# Patient Record
Sex: MALE | Race: WHITE | Employment: PART TIME | URBAN - METROPOLITAN AREA
[De-identification: names, ages, dates, MRNs, and addresses within clinical notes are randomized per-mention and may not be internally consistent; named-entity substitution may affect disease eponyms.]

---

## 2020-07-01 ENCOUNTER — NURSE ONLY (OUTPATIENT)
Dept: PRIMARY CARE CLINIC | Age: 30
End: 2020-07-01

## 2020-07-01 PROCEDURE — 99211 OFF/OP EST MAY X REQ PHY/QHP: CPT | Performed by: NURSE PRACTITIONER

## 2020-07-04 LAB
SARS-COV-2: NOT DETECTED
SOURCE: NORMAL

## 2022-01-16 ENCOUNTER — HOSPITAL ENCOUNTER (EMERGENCY)
Age: 32
Discharge: HOME | End: 2022-01-16
Attending: EMERGENCY MEDICINE
Payer: COMMERCIAL

## 2022-01-16 ENCOUNTER — APPOINTMENT (OUTPATIENT)
Dept: GENERAL RADIOLOGY | Age: 32
End: 2022-01-16
Payer: COMMERCIAL

## 2022-01-16 VITALS
DIASTOLIC BLOOD PRESSURE: 87 MMHG | RESPIRATION RATE: 18 BRPM | TEMPERATURE: 98.2 F | SYSTOLIC BLOOD PRESSURE: 119 MMHG | HEART RATE: 54 BPM | BODY MASS INDEX: 31.81 KG/M2 | OXYGEN SATURATION: 98 % | HEIGHT: 73 IN | WEIGHT: 240 LBS

## 2022-01-16 DIAGNOSIS — S61.411A LACERATION OF RIGHT HAND WITHOUT FOREIGN BODY, INITIAL ENCOUNTER: Primary | ICD-10-CM

## 2022-01-16 PROCEDURE — 73130 X-RAY EXAM OF HAND: CPT

## 2022-01-16 PROCEDURE — 90715 TDAP VACCINE 7 YRS/> IM: CPT | Performed by: EMERGENCY MEDICINE

## 2022-01-16 PROCEDURE — 6360000002 HC RX W HCPCS: Performed by: EMERGENCY MEDICINE

## 2022-01-16 PROCEDURE — 12001 RPR S/N/AX/GEN/TRNK 2.5CM/<: CPT

## 2022-01-16 PROCEDURE — 90471 IMMUNIZATION ADMIN: CPT | Performed by: EMERGENCY MEDICINE

## 2022-01-16 PROCEDURE — 99283 EMERGENCY DEPT VISIT LOW MDM: CPT

## 2022-01-16 PROCEDURE — 2500000003 HC RX 250 WO HCPCS: Performed by: EMERGENCY MEDICINE

## 2022-01-16 RX ORDER — LIDOCAINE HYDROCHLORIDE AND EPINEPHRINE 10; 10 MG/ML; UG/ML
20 INJECTION, SOLUTION INFILTRATION; PERINEURAL ONCE
Status: COMPLETED | OUTPATIENT
Start: 2022-01-16 | End: 2022-01-16

## 2022-01-16 RX ADMIN — TETANUS TOXOID, REDUCED DIPHTHERIA TOXOID AND ACELLULAR PERTUSSIS VACCINE, ADSORBED 0.5 ML: 5; 2.5; 8; 8; 2.5 SUSPENSION INTRAMUSCULAR at 06:11

## 2022-01-16 RX ADMIN — LIDOCAINE HYDROCHLORIDE,EPINEPHRINE BITARTRATE 20 ML: 10; .01 INJECTION, SOLUTION INFILTRATION; PERINEURAL at 05:52

## 2022-01-16 ASSESSMENT — PAIN SCALES - GENERAL: PAINLEVEL_OUTOF10: 10

## 2022-01-16 NOTE — ED PROVIDER NOTES
[address] [facility]          ATTENDING PHYSICIAN NOTE       Date of evaluation: 1/16/2022    Chief Complaint     Laceration (glass shattered)      History of Present Illness     Ha Pink is a 36 y.o. male who presents complaints of a right hand laceration after putting his hand through a piece of glass in his basement accidentally. The patient denies any numbness or tingling in the fingers distal to the injury. His tetanus is not up-to-date. He has not noted any weakness of the fingers and has full range of motion. He admits to drinking alcohol tonight. Review of Systems     Paresthesias are not reported. The patient denies any other injuries. See HPI for further details. Review of systems otherwise negative. Past Medical, Surgical, Family, and Social History     He has a past medical history of Murmur, cardiac. He has no past surgical history on file. His family history is not on file. He reports that he has been smoking cigarettes. He has been smoking about 1.00 pack per day. He has never used smokeless tobacco. He reports current alcohol use of about 84.0 standard drinks of alcohol per week. He reports that he does not use drugs. Medications     Current Discharge Medication List      CONTINUE these medications which have NOT CHANGED    Details   ibuprofen (ADVIL;MOTRIN) 600 MG tablet Take 1 tablet by mouth every 6 hours as needed for Pain. Qty: 30 tablet, Refills: 0             Allergies     He has No Known Allergies. Physical Exam     INITIAL VITALS: BP: 119/87, Temp: 98.2 °F (36.8 °C), Pulse: 54, Resp: 18, SpO2: 98 %   Constitutional:  Well developed, well nourished, no acute distress, non-toxic appearance   Musculoskeletal:  mild tenderness at site, Full range of motion distal to the affected area.   Fourth and fifth digits have full range of motion against resistance  Neurologic:  Distal neurovascular exam is intact, no loss of given.  Antibiotics were not prescribed. Clinical Impression     1.  Laceration of right hand without foreign body, initial encounter        Disposition     PATIENT REFERRED TO:  Isadora Boudreaux MD    In 10 days  For suture removal    [facility] Emergency Department  [address]  [address]  [phone]  In 10 days  For suture removal if unable to see PCP    [facility] Emergency Department  [address] [place] [address]  [phone]    if signs of infection develop      DISCHARGE MEDICATIONS:  Current Discharge Medication List          DISPOSITION DISPOSITION Decision To Discharge 01/16/2022 05:13:36 AM          Matthew Bautista MD  01/16/22 1983

## 2022-11-06 ENCOUNTER — HOSPITAL ENCOUNTER (EMERGENCY)
Age: 32
Discharge: HOME | End: 2022-11-06
Attending: EMERGENCY MEDICINE
Payer: COMMERCIAL

## 2022-11-06 ENCOUNTER — APPOINTMENT (OUTPATIENT)
Dept: GENERAL RADIOLOGY | Age: 32
End: 2022-11-06
Payer: COMMERCIAL

## 2022-11-06 VITALS
SYSTOLIC BLOOD PRESSURE: 142 MMHG | HEART RATE: 100 BPM | TEMPERATURE: 98.8 F | WEIGHT: 230 LBS | DIASTOLIC BLOOD PRESSURE: 95 MMHG | OXYGEN SATURATION: 94 % | RESPIRATION RATE: 17 BRPM | HEIGHT: 73 IN | BODY MASS INDEX: 30.48 KG/M2

## 2022-11-06 DIAGNOSIS — S62.639B OPEN DISPLACED FRACTURE OF DISTAL PHALANX OF FINGER, UNSPECIFIED FINGER, INITIAL ENCOUNTER: Primary | ICD-10-CM

## 2022-11-06 PROCEDURE — 73130 X-RAY EXAM OF HAND: CPT

## 2022-11-06 PROCEDURE — 73110 X-RAY EXAM OF WRIST: CPT

## 2022-11-06 PROCEDURE — 99283 EMERGENCY DEPT VISIT LOW MDM: CPT

## 2022-11-06 RX ORDER — NICOTINE 21 MG/24HR
1 PATCH, TRANSDERMAL 24 HOURS TRANSDERMAL DAILY
Status: DISCONTINUED | OUTPATIENT
Start: 2022-11-06 | End: 2022-11-07 | Stop reason: HOSPADM

## 2022-11-06 RX ORDER — LIDOCAINE HYDROCHLORIDE AND EPINEPHRINE 10; 10 MG/ML; UG/ML
20 INJECTION, SOLUTION INFILTRATION; PERINEURAL ONCE
Status: DISCONTINUED | OUTPATIENT
Start: 2022-11-06 | End: 2022-11-07 | Stop reason: HOSPADM

## 2022-11-06 ASSESSMENT — PAIN DESCRIPTION - FREQUENCY: FREQUENCY: CONTINUOUS

## 2022-11-06 ASSESSMENT — PAIN - FUNCTIONAL ASSESSMENT: PAIN_FUNCTIONAL_ASSESSMENT: 0-10

## 2022-11-06 ASSESSMENT — PAIN SCALES - GENERAL: PAINLEVEL_OUTOF10: 2

## 2022-11-06 ASSESSMENT — PAIN DESCRIPTION - LOCATION: LOCATION: HAND

## 2022-11-06 ASSESSMENT — PAIN DESCRIPTION - PAIN TYPE: TYPE: ACUTE PAIN

## 2022-11-06 ASSESSMENT — PAIN DESCRIPTION - DESCRIPTORS: DESCRIPTORS: BURNING

## 2022-11-06 ASSESSMENT — PAIN DESCRIPTION - ORIENTATION: ORIENTATION: RIGHT

## 2022-11-07 NOTE — ED PROVIDER NOTES
[address] [facility] RESIDENT NOTE       Date of evaluation: 11/6/2022    Chief Complaint     Hand Injury      History of Present Illness     Vielka Franklin is a 29 y.o. male who presents for right hand injury. He reports that around 8 PM, he accidentally closed his right hand in the door while he was running out of a room. He is ambidextrous, but more right than left hand dominant. He works as a  at [employer]. He subsequently had immediate pain in his middle finger and ring finger, tetanus updated within the year, no numbness or tingling. He has been able to move his hand since the accident. No other areas of pain or injury, did not hit his head, or another extremity. Drank 2 beers prior to arrival.  Smokes 2 packs per day. Review of Systems     A complete review of systems was performed and is otherwise negative. Past Medical, Surgical, Family, and Social History     He has a past medical history of Murmur, cardiac. He has no past surgical history on file. His family history is not on file. He reports that he has been smoking cigarettes. He has been smoking an average of 2 packs per day. He has never used smokeless tobacco. He reports current alcohol use of about 10.0 standard drinks per week. He reports current drug use. Drug: Marijuana Chrissie Eganya). Medications     Previous Medications    IBUPROFEN (ADVIL;MOTRIN) 600 MG TABLET    Take 1 tablet by mouth every 6 hours as needed for Pain. Allergies     He has No Known Allergies. Physical Exam     INITIAL VITALS: BP: (!) 142/95, Temp: 98.8 °F (37.1 °C), Heart Rate: 100, Resp: 17, SpO2: 94 %      General: Anxious appearing. No acute distress    Eyes:  Pupils equal, round, reactive to light. No discharge from eyes    ENT:  No discharge from nose. OP clear   Neck:  Supple, trachea midline   Pulmonary:   Non-labored breathing.  Breath sounds clear bilaterally  Hand Exam:  Left Hand  Hand warm and pink. Good cap refill in all digits. + radial pulse, + ulnar pulse. No gross step offs or bony abnormalities noted. Normal finger cascade. +Sensation intact to light touch throughout median, ulnar and radial nerve distributions. Flexion intact in all digits at all MCPs, PIPs, DIPs. Extension intact in all digits at all MCPs, PIPs, DIPs. Wounds: none     Right Hand  Hand warm and pink. Good cap refill in all digits. + radial pulse, + ulnar pulse. Tenderness to palpation of the middle finger and fourth finger distal phalanx. Normal finger cascade. +Sensation intact to light touch throughout median, ulnar and radial nerve distributions. Flexion intact in all digits at all MCPs, PIPs, DIPs. Extension intact in all digits at all MCPs, PIPs, DIPs but with significant pain to extension at the DIP and the middle and ring fingers. Wounds: Completely displaced however intact appearing, grossly, nail of the middle finger and ring finger; significant soft tissue defect with overlying hematoma and clot over both middle and ring fingers prior to washout. Musculoskeletal:  No soft tissue injury deformity to the forearms, or wrist.  Vascular:  Extremities warm and perfused. Normal pulses in all 4 extremities  Skin:  Dry, a avulsed nailbed of the middle finger and ring finger on the right hand, with underlying soft tissue defects otherwise no soft tissue injuries   Extremities:  No peripheral edema in bilateral lower extremities   Neuro:  Alert. Moves all four extremities to command. No focal deficit     DiagnosticResults     EKG   Interpreted in conjunction with emergencydepartment physician Chandra Silva MD     RADIOLOGY:  XR HAND RIGHT (MIN 3 VIEWS)   Final Result      1. Right wrist: No acute fracture      2. Right hand: Soft tissue defect and acute avulsion fractures involving the distal phalanges of the third and fourth digits with fragmentation of the distal phalanges, comminuted fractures.  Old healed right fifth distal metacarpal fracture also noted         XR WRIST RIGHT (MIN 3 VIEWS)   Final Result      1. Right wrist: No acute fracture      2. Right hand: Soft tissue defect and acute avulsion fractures involving the distal phalanges of the third and fourth digits with fragmentation of the distal phalanges, comminuted fractures. Old healed right fifth distal metacarpal fracture also noted             LABS:   No results found for this visit on 11/06/22. ED BEDSIDE ULTRASOUND:   n/a    RECENT VITALS:  BP: (!) 142/95, Temp: 98.8 °F (37.1 °C), Heart Rate: 100,Resp: 17, SpO2: 94 %     Procedures      n/a    ED Course     Nursing Notes, Past Medical Hx, Past Surgical Hx, Social Hx, Allergies, and Family Hx were reviewed. The patient was given the followingmedications:  Orders Placed This Encounter   Medications    lidocaine-EPINEPHrine 1 %-1:770988 injection 20 mL    ceFAZolin (ANCEF) 2,000 mg in sodium chloride 0.9 % 50 mL IVPB (mini-bag)     Order Specific Question:   Antimicrobial Indications     Answer:   Surgical Prophylaxis    nicotine (NICODERM CQ) 21 MG/24HR 1 patch       CONSULTS:  None    MEDICAL DECISION MAKING / ASSESSMENT / Jad Elle is a 29 y.o. male right-hand-dominant greater than left hand, who presents for evaluation of hand injury with open fracture of the middle and fourth finger distal phalanx, consistent with a Daniel fracture. Administered Ancef, wound was washed out after digital block, and plan to to discuss with Ortho. Concern for necessity of OR vs revision amputation. Tdap UTD. Unfortunately, after the patient without warning left to go smoke outside, it was discovered that the friend who drove him here had also left, and bedside nurse noted that they had left and left from the emergency department.   Myself and Dr. Ny Navarro both went to the lobby to see if we could bring them back in for further discussion, and one of the support staff walked out to the parking lot and they said they were leaving and going to another place without any further specification. As the patient eloped without warning, I was unable to have a discussion regarding leaving [address] as here he has an injury with high risk of malunion, infection, and permanent injury and debility. I was unable to washout the wound, the patient did not get an IV in his IV antibiotics that were previously ordered, and a splint was not able to be placed as the patient left without warning from the emergency department. He was awake, alert, and oriented and otherwise appeared to have capacity. This patient was also evaluated by the attending physician. All care plans werediscussed and agreed upon. ED Course as of 11/06/22 2247   [facility] Nov 06, 2022 2107 Tdap 1/2022 [LG]   2202 Middle and fourth finger nick fx [LG]   2211 Pt updated regarding plan/dx [LG]   2232 Went to block and wash out, pt left without notification to go smoke  [LG]      ED Course User Index  [LG] Joan Regalado MD         Clinical Impression     1. Open displaced fracture of distal phalanx of finger, unspecified finger, initial encounter        Disposition     PATIENT REFERRED TO:  No follow-up provider specified.     DISCHARGE MEDICATIONS:  New Prescriptions    No medications on file       DISPOSITION Eloped - Left Before Treatment Complete 11/06/2022 10:44:21 PM      Nusrat Murray MD    PGY-3, [facility]   Emergency Medicine        Joan Regalado MD  Resident  11/06/22 2868

## 2022-11-07 NOTE — ED PROVIDER NOTES
ED Attending Attestation Note     Date of evaluation: 11/6/2022    This patient was seen by the resident. I have seen and examined the patient, agree with the workup, evaluation, management and diagnosis. The care plan has been discussed. My assessment reveals a generally well-developed, well-appearing young gentleman, in some discomfort, but in no acute respiratory distress. He presents to the emergency department with an injury to his right hand, in which he slammed his right hand in a door. He has obvious significant deformity of the distal phalanges of the third and fourth fingers on his right hand, with displacement of the nails from the nailbed and eponychial fold, and evidence for potentially significant underlying laceration. Plain films show obvious comminuted fractures of the distal phalanges of these fingers. Plan was for digital block and washout, as well as IV Ancef, and hand consultation, for potential need for surgical management. All of these things I discussed with the patient myself, at the bedside. Unfortunately, however, the patient eloped from the emergency department before these interventions could be performed.        Janey Bradley MD  11/12/22 9319

## 2022-11-07 NOTE — ED NOTES
Pt left before this RN was able to insert IV and administer abx. Pt was agitated about the wait to have his fingers cleaned out and monitor. Pt stated that he was leaving to go to another hospital and walked out.        Jerzy Chapin RN  11/06/22 3724